# Patient Record
Sex: FEMALE | Race: WHITE | NOT HISPANIC OR LATINO
[De-identification: names, ages, dates, MRNs, and addresses within clinical notes are randomized per-mention and may not be internally consistent; named-entity substitution may affect disease eponyms.]

---

## 2023-09-11 PROBLEM — Z00.00 ENCOUNTER FOR PREVENTIVE HEALTH EXAMINATION: Status: ACTIVE | Noted: 2023-09-11

## 2023-11-28 ENCOUNTER — APPOINTMENT (OUTPATIENT)
Dept: UROGYNECOLOGY | Facility: CLINIC | Age: 78
End: 2023-11-28
Payer: COMMERCIAL

## 2023-11-28 LAB
BILIRUB UR QL STRIP: NEGATIVE
CLARITY UR: CLEAR
COLLECTION METHOD: NORMAL
GLUCOSE UR-MCNC: NEGATIVE
HCG UR QL: 0.2 EU/DL
HGB UR QL STRIP.AUTO: NEGATIVE
KETONES UR-MCNC: NEGATIVE
LEUKOCYTE ESTERASE UR QL STRIP: NEGATIVE
NITRITE UR QL STRIP: NEGATIVE
PH UR STRIP: 7
PROT UR STRIP-MCNC: NEGATIVE
SP GR UR STRIP: 1.01

## 2023-11-28 PROCEDURE — 81003 URINALYSIS AUTO W/O SCOPE: CPT | Mod: QW

## 2023-11-28 PROCEDURE — 51798 US URINE CAPACITY MEASURE: CPT

## 2023-11-28 PROCEDURE — 99204 OFFICE O/P NEW MOD 45 MIN: CPT | Mod: 25

## 2023-11-28 RX ORDER — CLOBETASOL PROPIONATE 0.5 MG/G
0.05 OINTMENT TOPICAL DAILY
Qty: 1 | Refills: 0 | Status: ACTIVE | COMMUNITY
Start: 2023-11-28 | End: 1900-01-01

## 2023-11-28 RX ORDER — VIBEGRON 75 MG/1
75 TABLET, FILM COATED ORAL
Qty: 90 | Refills: 3 | Status: ACTIVE | COMMUNITY
Start: 2023-11-28 | End: 1900-01-01

## 2023-12-27 ENCOUNTER — APPOINTMENT (OUTPATIENT)
Dept: UROGYNECOLOGY | Facility: CLINIC | Age: 78
End: 2023-12-27
Payer: COMMERCIAL

## 2023-12-27 VITALS
HEIGHT: 63 IN | DIASTOLIC BLOOD PRESSURE: 65 MMHG | OXYGEN SATURATION: 99 % | BODY MASS INDEX: 23.21 KG/M2 | HEART RATE: 63 BPM | WEIGHT: 131 LBS | SYSTOLIC BLOOD PRESSURE: 157 MMHG

## 2023-12-27 DIAGNOSIS — N32.81 OVERACTIVE BLADDER: ICD-10-CM

## 2023-12-27 DIAGNOSIS — R35.1 NOCTURIA: ICD-10-CM

## 2023-12-27 PROCEDURE — 99214 OFFICE O/P EST MOD 30 MIN: CPT

## 2023-12-28 NOTE — DISCUSSION/SUMMARY
[FreeTextEntry1] : We reviewed behavioral modifications in detail.  Pt states she is not going to give up her coffee or wine.  She will continue the gemtesa and try to cut out the seltzer.  We also discussed the relationship between IVONE and nocturia.  We reviewed third line treatment options.  If she remains on gemtesa, she will f/u in 6-9 months.  If she would like further treatment, she will call office.  Instructed to call with any questions or concerns and she verbalizes understanding.

## 2023-12-28 NOTE — HISTORY OF PRESENT ILLNESS
[FreeTextEntry1] : Pt presents to office for f/u on OAB.  She was started on gemtesa at last visit.  She is not sure she has had much improvement as she is still consuming multiple bladder irritants.  She has 8oz coffee in AM, seltzer during the day, a cocktail at 5pm and a glass of wine with dinner.  Notes that nocturia is her most bothersome symptom, she is walking at least 3x/night to urinate.  Per pt, she has untreated IVONE (she does not want to use a machine).  She does report less leakage.  She denies any SE from the medication and denies any subjective feelings of incomplete emptying.  Using clobetasol with no issues.